# Patient Record
Sex: FEMALE | Race: OTHER | URBAN - METROPOLITAN AREA
[De-identification: names, ages, dates, MRNs, and addresses within clinical notes are randomized per-mention and may not be internally consistent; named-entity substitution may affect disease eponyms.]

---

## 2018-03-08 ENCOUNTER — EMERGENCY (EMERGENCY)
Facility: HOSPITAL | Age: 63
LOS: 0 days | Discharge: HOME | End: 2018-03-09
Attending: EMERGENCY MEDICINE

## 2018-03-08 VITALS
HEART RATE: 75 BPM | OXYGEN SATURATION: 96 % | SYSTOLIC BLOOD PRESSURE: 174 MMHG | RESPIRATION RATE: 20 BRPM | TEMPERATURE: 98 F | DIASTOLIC BLOOD PRESSURE: 82 MMHG

## 2018-03-08 DIAGNOSIS — J18.9 PNEUMONIA, UNSPECIFIED ORGANISM: ICD-10-CM

## 2018-03-08 DIAGNOSIS — Z91.018 ALLERGY TO OTHER FOODS: ICD-10-CM

## 2018-03-08 DIAGNOSIS — R06.02 SHORTNESS OF BREATH: ICD-10-CM

## 2018-03-08 DIAGNOSIS — Z98.891 HISTORY OF UTERINE SCAR FROM PREVIOUS SURGERY: Chronic | ICD-10-CM

## 2018-03-08 DIAGNOSIS — Z98.890 OTHER SPECIFIED POSTPROCEDURAL STATES: ICD-10-CM

## 2018-03-08 RX ORDER — IPRATROPIUM/ALBUTEROL SULFATE 18-103MCG
3 AEROSOL WITH ADAPTER (GRAM) INHALATION ONCE
Qty: 0 | Refills: 0 | Status: COMPLETED | OUTPATIENT
Start: 2018-03-08 | End: 2018-03-08

## 2018-03-08 RX ADMIN — Medication 3 MILLILITER(S): at 23:37

## 2018-03-09 VITALS
OXYGEN SATURATION: 95 % | RESPIRATION RATE: 16 BRPM | HEART RATE: 86 BPM | DIASTOLIC BLOOD PRESSURE: 70 MMHG | TEMPERATURE: 99 F | SYSTOLIC BLOOD PRESSURE: 123 MMHG

## 2018-03-09 LAB
ALBUMIN SERPL ELPH-MCNC: 4 G/DL — SIGNIFICANT CHANGE UP (ref 3–5.5)
ALP SERPL-CCNC: 72 U/L — SIGNIFICANT CHANGE UP (ref 30–115)
ALT FLD-CCNC: 34 U/L — SIGNIFICANT CHANGE UP (ref 0–41)
ANION GAP SERPL CALC-SCNC: 10 MMOL/L — SIGNIFICANT CHANGE UP (ref 7–14)
AST SERPL-CCNC: 45 U/L — HIGH (ref 0–41)
BASOPHILS # BLD AUTO: 0.04 K/UL — SIGNIFICANT CHANGE UP (ref 0–0.2)
BASOPHILS NFR BLD AUTO: 0.4 % — SIGNIFICANT CHANGE UP (ref 0–1)
BILIRUB SERPL-MCNC: 0.5 MG/DL — SIGNIFICANT CHANGE UP (ref 0.2–1.2)
BUN SERPL-MCNC: 8 MG/DL — LOW (ref 10–20)
CALCIUM SERPL-MCNC: 8.8 MG/DL — SIGNIFICANT CHANGE UP (ref 8.5–10.1)
CHLORIDE SERPL-SCNC: 100 MMOL/L — SIGNIFICANT CHANGE UP (ref 98–110)
CO2 SERPL-SCNC: 25 MMOL/L — SIGNIFICANT CHANGE UP (ref 17–32)
CREAT SERPL-MCNC: 0.8 MG/DL — SIGNIFICANT CHANGE UP (ref 0.7–1.5)
EOSINOPHIL # BLD AUTO: 0.34 K/UL — SIGNIFICANT CHANGE UP (ref 0–0.7)
EOSINOPHIL NFR BLD AUTO: 3.7 % — SIGNIFICANT CHANGE UP (ref 0–8)
GLUCOSE SERPL-MCNC: 109 MG/DL — SIGNIFICANT CHANGE UP (ref 70–110)
HCT VFR BLD CALC: 43.4 % — SIGNIFICANT CHANGE UP (ref 37–47)
HGB BLD-MCNC: 14.2 G/DL — SIGNIFICANT CHANGE UP (ref 12–16)
IMM GRANULOCYTES NFR BLD AUTO: 0.2 % — SIGNIFICANT CHANGE UP (ref 0.1–0.3)
LYMPHOCYTES # BLD AUTO: 0.59 K/UL — LOW (ref 1.2–3.4)
LYMPHOCYTES # BLD AUTO: 6.4 % — LOW (ref 20.5–51.1)
MCHC RBC-ENTMCNC: 27.4 PG — SIGNIFICANT CHANGE UP (ref 27–31)
MCHC RBC-ENTMCNC: 32.7 G/DL — SIGNIFICANT CHANGE UP (ref 32–37)
MCV RBC AUTO: 83.8 FL — SIGNIFICANT CHANGE UP (ref 81–99)
MONOCYTES # BLD AUTO: 0.44 K/UL — SIGNIFICANT CHANGE UP (ref 0.1–0.6)
MONOCYTES NFR BLD AUTO: 4.8 % — SIGNIFICANT CHANGE UP (ref 1.7–9.3)
NEUTROPHILS # BLD AUTO: 7.77 K/UL — HIGH (ref 1.4–6.5)
NEUTROPHILS NFR BLD AUTO: 84.5 % — HIGH (ref 42.2–75.2)
NRBC # BLD: 0 /100 WBCS — SIGNIFICANT CHANGE UP (ref 0–0)
PLATELET # BLD AUTO: 235 K/UL — SIGNIFICANT CHANGE UP (ref 130–400)
POTASSIUM SERPL-MCNC: 3.6 MMOL/L — SIGNIFICANT CHANGE UP (ref 3.5–5)
POTASSIUM SERPL-SCNC: 3.6 MMOL/L — SIGNIFICANT CHANGE UP (ref 3.5–5)
PROT SERPL-MCNC: 6.8 G/DL — SIGNIFICANT CHANGE UP (ref 6–8)
RBC # BLD: 5.18 M/UL — SIGNIFICANT CHANGE UP (ref 4.2–5.4)
RBC # FLD: 11.6 % — SIGNIFICANT CHANGE UP (ref 11.5–14.5)
SODIUM SERPL-SCNC: 135 MMOL/L — SIGNIFICANT CHANGE UP (ref 135–146)
WBC # BLD: 9.2 K/UL — SIGNIFICANT CHANGE UP (ref 4.8–10.8)
WBC # FLD AUTO: 9.2 K/UL — SIGNIFICANT CHANGE UP (ref 4.8–10.8)

## 2018-03-09 RX ORDER — ALBUTEROL 90 UG/1
1 AEROSOL, METERED ORAL ONCE
Qty: 0 | Refills: 0 | Status: COMPLETED | OUTPATIENT
Start: 2018-03-09 | End: 2018-03-09

## 2018-03-09 RX ORDER — CIPROFLOXACIN LACTATE 400MG/40ML
1 VIAL (ML) INTRAVENOUS
Qty: 10 | Refills: 0 | OUTPATIENT
Start: 2018-03-09 | End: 2018-03-18

## 2018-03-09 RX ORDER — ACETAMINOPHEN 500 MG
975 TABLET ORAL ONCE
Qty: 0 | Refills: 0 | Status: COMPLETED | OUTPATIENT
Start: 2018-03-09 | End: 2018-03-09

## 2018-03-09 RX ADMIN — Medication 975 MILLIGRAM(S): at 03:44

## 2018-03-09 RX ADMIN — ALBUTEROL 1 PUFF(S): 90 AEROSOL, METERED ORAL at 04:18

## 2018-03-09 NOTE — ED PROVIDER NOTE - PHYSICAL EXAMINATION
VITAL SIGNS: I have reviewed nursing notes and confirm.  CONSTITUTIONAL: Well-developed; well-nourished; in no acute distress.  SKIN: Skin exam is warm and dry, no acute rash.  HEAD: Normocephalic; atraumatic.  EYES: PERRL, EOM intact; conjunctiva and sclera clear.  ENT: +clear nasal discharge; airway clear. TMs clear.  NECK: Supple; non tender. No lad.  CARD: S1, S2 normal; no murmurs, gallops, or rubs. Regular rate and rhythm.  RESP: +rhonchi cleared w/coughing, no wheezes/rales  ABD: Normal bowel sounds; soft; non-distended; non-tender; no hepatosplenomegaly.  EXT: Normal ROM. No clubbing, cyanosis or edema.  NEURO: Alert, oriented. Grossly unremarkable. No focal deficits.  PSYCH: Cooperative, appropriate.

## 2018-03-09 NOTE — ED PROVIDER NOTE - CARE PLAN
Principal Discharge DX:	Cough  Secondary Diagnosis:	SOB (shortness of breath) Principal Discharge DX:	Pneumonia  Secondary Diagnosis:	SOB (shortness of breath)  Secondary Diagnosis:	Cough

## 2018-03-09 NOTE — ED PROVIDER NOTE - NS ED ROS FT
Constitutional: No fever, chills, unintended weight loss.  Eyes:  No visual changes, eye pain or discharge.  ENMT:  No hearing changes, pain, no sore throat or runny nose, no difficulty swallowing  Cardiac:  No chest pain, +SOB, no edema. No chest pain with exertion.  Respiratory:  +cough no respiratory distress. No hemoptysis. No history of asthma or RAD.  GI:  No nausea, vomiting, diarrhea or abdominal pain.  :  No dysuria, frequency or burning.  MS:  No myalgia, muscle weakness, joint pain or back pain.  Neuro:  No headache or weakness.  No LOC.  Skin:  No skin rash.   Endocrine: No history of thyroid disease or diabetes.

## 2018-03-09 NOTE — ED PROVIDER NOTE - OBJECTIVE STATEMENT
61yo f no pmh p/w sob x 2d. Accomp by congestion & incr cough x 2 wks. Denies f/c, cp, gallo, nvd, abd pain, urinary sx, rash. +Sick contact, grandson w/viral illness. Non-smoker.

## 2018-03-09 NOTE — ED PROVIDER NOTE - PROGRESS NOTE DETAILS
pt feeling much better, SOB improved - return precautions given, son @ bedside - advised close f/u with PMD in [place] pt re-vitaled prior to dispo and now febrile - given clinical hx will tx for PNA

## 2024-07-31 ENCOUNTER — EMERGENCY (EMERGENCY)
Facility: HOSPITAL | Age: 69
LOS: 0 days | Discharge: HOME | End: 2024-07-31
Attending: EMERGENCY MEDICINE
Payer: MEDICAID

## 2024-07-31 VITALS
DIASTOLIC BLOOD PRESSURE: 69 MMHG | HEART RATE: 88 BPM | RESPIRATION RATE: 18 BRPM | HEIGHT: 59 IN | OXYGEN SATURATION: 100 % | SYSTOLIC BLOOD PRESSURE: 131 MMHG | TEMPERATURE: 98 F | WEIGHT: 104.06 LBS

## 2024-07-31 VITALS
OXYGEN SATURATION: 98 % | HEART RATE: 65 BPM | RESPIRATION RATE: 18 BRPM | TEMPERATURE: 98 F | DIASTOLIC BLOOD PRESSURE: 66 MMHG | SYSTOLIC BLOOD PRESSURE: 119 MMHG

## 2024-07-31 DIAGNOSIS — E78.5 HYPERLIPIDEMIA, UNSPECIFIED: ICD-10-CM

## 2024-07-31 DIAGNOSIS — R19.7 DIARRHEA, UNSPECIFIED: ICD-10-CM

## 2024-07-31 DIAGNOSIS — Z91.018 ALLERGY TO OTHER FOODS: ICD-10-CM

## 2024-07-31 DIAGNOSIS — K57.92 DIVERTICULITIS OF INTESTINE, PART UNSPECIFIED, WITHOUT PERFORATION OR ABSCESS WITHOUT BLEEDING: ICD-10-CM

## 2024-07-31 DIAGNOSIS — R10.31 RIGHT LOWER QUADRANT PAIN: ICD-10-CM

## 2024-07-31 DIAGNOSIS — Z98.891 HISTORY OF UTERINE SCAR FROM PREVIOUS SURGERY: Chronic | ICD-10-CM

## 2024-07-31 DIAGNOSIS — R50.9 FEVER, UNSPECIFIED: ICD-10-CM

## 2024-07-31 LAB
ALBUMIN SERPL ELPH-MCNC: 4.3 G/DL — SIGNIFICANT CHANGE UP (ref 3.5–5.2)
ALP SERPL-CCNC: 58 U/L — SIGNIFICANT CHANGE UP (ref 30–115)
ALT FLD-CCNC: 11 U/L — SIGNIFICANT CHANGE UP (ref 0–41)
ANION GAP SERPL CALC-SCNC: 11 MMOL/L — SIGNIFICANT CHANGE UP (ref 7–14)
APTT BLD: 34.9 SEC — SIGNIFICANT CHANGE UP (ref 27–39.2)
AST SERPL-CCNC: 15 U/L — SIGNIFICANT CHANGE UP (ref 0–41)
BASOPHILS # BLD AUTO: 0.05 K/UL — SIGNIFICANT CHANGE UP (ref 0–0.2)
BASOPHILS NFR BLD AUTO: 0.4 % — SIGNIFICANT CHANGE UP (ref 0–1)
BILIRUB SERPL-MCNC: 0.6 MG/DL — SIGNIFICANT CHANGE UP (ref 0.2–1.2)
BLD GP AB SCN SERPL QL: SIGNIFICANT CHANGE UP
BUN SERPL-MCNC: 7 MG/DL — LOW (ref 10–20)
CALCIUM SERPL-MCNC: 8.4 MG/DL — SIGNIFICANT CHANGE UP (ref 8.4–10.5)
CHLORIDE SERPL-SCNC: 104 MMOL/L — SIGNIFICANT CHANGE UP (ref 98–110)
CO2 SERPL-SCNC: 24 MMOL/L — SIGNIFICANT CHANGE UP (ref 17–32)
CREAT SERPL-MCNC: 0.7 MG/DL — SIGNIFICANT CHANGE UP (ref 0.7–1.5)
EGFR: 95 ML/MIN/1.73M2 — SIGNIFICANT CHANGE UP
EOSINOPHIL # BLD AUTO: 0.2 K/UL — SIGNIFICANT CHANGE UP (ref 0–0.7)
EOSINOPHIL NFR BLD AUTO: 1.6 % — SIGNIFICANT CHANGE UP (ref 0–8)
GLUCOSE SERPL-MCNC: 93 MG/DL — SIGNIFICANT CHANGE UP (ref 70–99)
HCT VFR BLD CALC: 41.8 % — SIGNIFICANT CHANGE UP (ref 37–47)
HGB BLD-MCNC: 13.5 G/DL — SIGNIFICANT CHANGE UP (ref 12–16)
IMM GRANULOCYTES NFR BLD AUTO: 0.3 % — SIGNIFICANT CHANGE UP (ref 0.1–0.3)
INR BLD: 1.03 RATIO — SIGNIFICANT CHANGE UP (ref 0.65–1.3)
LYMPHOCYTES # BLD AUTO: 1.32 K/UL — SIGNIFICANT CHANGE UP (ref 1.2–3.4)
LYMPHOCYTES # BLD AUTO: 10.5 % — LOW (ref 20.5–51.1)
MCHC RBC-ENTMCNC: 28 PG — SIGNIFICANT CHANGE UP (ref 27–31)
MCHC RBC-ENTMCNC: 32.3 G/DL — SIGNIFICANT CHANGE UP (ref 32–37)
MCV RBC AUTO: 86.5 FL — SIGNIFICANT CHANGE UP (ref 81–99)
MONOCYTES # BLD AUTO: 0.67 K/UL — HIGH (ref 0.1–0.6)
MONOCYTES NFR BLD AUTO: 5.3 % — SIGNIFICANT CHANGE UP (ref 1.7–9.3)
NEUTROPHILS # BLD AUTO: 10.28 K/UL — HIGH (ref 1.4–6.5)
NEUTROPHILS NFR BLD AUTO: 81.9 % — HIGH (ref 42.2–75.2)
NRBC # BLD: 0 /100 WBCS — SIGNIFICANT CHANGE UP (ref 0–0)
PLATELET # BLD AUTO: 249 K/UL — SIGNIFICANT CHANGE UP (ref 130–400)
PMV BLD: 9.4 FL — SIGNIFICANT CHANGE UP (ref 7.4–10.4)
POTASSIUM SERPL-MCNC: 4 MMOL/L — SIGNIFICANT CHANGE UP (ref 3.5–5)
POTASSIUM SERPL-SCNC: 4 MMOL/L — SIGNIFICANT CHANGE UP (ref 3.5–5)
PROT SERPL-MCNC: 7 G/DL — SIGNIFICANT CHANGE UP (ref 6–8)
PROTHROM AB SERPL-ACNC: 11.8 SEC — SIGNIFICANT CHANGE UP (ref 9.95–12.87)
RBC # BLD: 4.83 M/UL — SIGNIFICANT CHANGE UP (ref 4.2–5.4)
RBC # FLD: 12.3 % — SIGNIFICANT CHANGE UP (ref 11.5–14.5)
SODIUM SERPL-SCNC: 139 MMOL/L — SIGNIFICANT CHANGE UP (ref 135–146)
WBC # BLD: 12.56 K/UL — HIGH (ref 4.8–10.8)
WBC # FLD AUTO: 12.56 K/UL — HIGH (ref 4.8–10.8)

## 2024-07-31 PROCEDURE — 85610 PROTHROMBIN TIME: CPT

## 2024-07-31 PROCEDURE — 74177 CT ABD & PELVIS W/CONTRAST: CPT | Mod: MC

## 2024-07-31 PROCEDURE — 85730 THROMBOPLASTIN TIME PARTIAL: CPT

## 2024-07-31 PROCEDURE — 86850 RBC ANTIBODY SCREEN: CPT

## 2024-07-31 PROCEDURE — 86901 BLOOD TYPING SEROLOGIC RH(D): CPT

## 2024-07-31 PROCEDURE — 99285 EMERGENCY DEPT VISIT HI MDM: CPT

## 2024-07-31 PROCEDURE — 74177 CT ABD & PELVIS W/CONTRAST: CPT | Mod: 26,MC

## 2024-07-31 PROCEDURE — 86900 BLOOD TYPING SEROLOGIC ABO: CPT

## 2024-07-31 PROCEDURE — 36415 COLL VENOUS BLD VENIPUNCTURE: CPT

## 2024-07-31 PROCEDURE — 85025 COMPLETE CBC W/AUTO DIFF WBC: CPT

## 2024-07-31 PROCEDURE — 80053 COMPREHEN METABOLIC PANEL: CPT

## 2024-07-31 PROCEDURE — 96374 THER/PROPH/DIAG INJ IV PUSH: CPT | Mod: XU

## 2024-07-31 PROCEDURE — 99284 EMERGENCY DEPT VISIT MOD MDM: CPT | Mod: 25

## 2024-07-31 PROCEDURE — 96375 TX/PRO/DX INJ NEW DRUG ADDON: CPT

## 2024-07-31 RX ORDER — METRONIDAZOLE 500 MG/1
500 TABLET ORAL ONCE
Refills: 0 | Status: COMPLETED | OUTPATIENT
Start: 2024-07-31 | End: 2024-07-31

## 2024-07-31 RX ORDER — MORPHINE SULFATE 100 MG/1
4 TABLET, EXTENDED RELEASE ORAL ONCE
Refills: 0 | Status: DISCONTINUED | OUTPATIENT
Start: 2024-07-31 | End: 2024-07-31

## 2024-07-31 RX ORDER — METRONIDAZOLE 500 MG/1
1 TABLET ORAL
Qty: 20 | Refills: 0
Start: 2024-07-31 | End: 2024-08-09

## 2024-07-31 RX ORDER — KETOROLAC TROMETHAMINE 30 MG/ML
15 INJECTION, SOLUTION INTRAMUSCULAR ONCE
Refills: 0 | Status: DISCONTINUED | OUTPATIENT
Start: 2024-07-31 | End: 2024-07-31

## 2024-07-31 RX ORDER — CIPROFLOXACIN HCL 500 MG
1 TABLET ORAL
Qty: 20 | Refills: 0
Start: 2024-07-31 | End: 2024-08-09

## 2024-07-31 RX ORDER — CIPROFLOXACIN HCL 500 MG
400 TABLET ORAL ONCE
Refills: 0 | Status: COMPLETED | OUTPATIENT
Start: 2024-07-31 | End: 2024-07-31

## 2024-07-31 RX ADMIN — KETOROLAC TROMETHAMINE 15 MILLIGRAM(S): 30 INJECTION, SOLUTION INTRAMUSCULAR at 18:34

## 2024-07-31 RX ADMIN — METRONIDAZOLE 100 MILLIGRAM(S): 500 TABLET ORAL at 18:03

## 2024-07-31 RX ADMIN — Medication 200 MILLIGRAM(S): at 18:03

## 2024-07-31 RX ADMIN — MORPHINE SULFATE 4 MILLIGRAM(S): 100 TABLET, EXTENDED RELEASE ORAL at 12:09

## 2024-07-31 NOTE — ED ADULT TRIAGE NOTE - CHIEF COMPLAINT QUOTE
Pt PATRICK from [facility] for RLQ abdominal pain with diarrhea starting this morning.

## 2024-07-31 NOTE — ED PROVIDER NOTE - PATIENT PORTAL LINK FT
You can access the FollowMyHealth Patient Portal offered by [facility] by registering at the following website: [email or web address]. By joining [facility]’s FollowMyHealth portal, you will also be able to view your health information using other applications (apps) compatible with our system.

## 2024-07-31 NOTE — ED PROVIDER NOTE - PHYSICAL EXAMINATION
CONST: Well appearing in NAD  EYES: PERRL, EOMI, Sclera and conjunctiva clear.   CARD: Normal S1 S2; Normal rate and rhythm  RESP: Equal BS B/L, No wheezes, rhonchi or rales. No distress  GI: BS equal, moderate tenderness with guarding RLQ  MS: Normal ROM in all extremities. No midline spinal tenderness.  SKIN: Warm, dry, no acute rashes. Good turgor  NEURO: A&Ox3, No focal deficits. Strength 5/5 with no sensory deficits.

## 2024-07-31 NOTE — ED ADULT NURSE NOTE - CHIEF COMPLAINT QUOTE
Pt PATRICK from [facility] for RLQ abdominal pain with diarrhea starting this morning.

## 2024-07-31 NOTE — ED PROVIDER NOTE - CLINICAL SUMMARY MEDICAL DECISION MAKING FREE TEXT BOX
68-year-old female found to have diverticulitis.  No focal abscess.  Vital signs reviewed, labs were ordered and reviewed.  Patient was given dose of antibiotic in ED, prescription for antibiotics was sent to the patient's pharmacy.  She feels comfortable going home, results were discussed with her and her son.  Upon reevaluation there is mild right lower quadrant tenderness to palpation, no rebound and no guarding.  Patient is tolerating p.o., good candidate for outpatient treatment advised to return to emergency room immediately if any worsening/concerning symptoms.  Both patient and son verbalized understanding and are amenable to DC plan.

## 2024-07-31 NOTE — ED PROVIDER NOTE - PROGRESS NOTE DETAILS
Dr. So: sign out to Dr. Bhatt   pending ct read  Ayla [id number] utilized to translate to son and patient diagnosis of diverticulitis and antibiotic instruction. Advised for worsening pain or fever to return to ED.     The patient was given detailed return precautions and advised to return to the emergency department if any new symptoms developed, symptoms worsened or for any concerns. The patient was offered the opportunity to ask questions and verbalized that they understand the diagnosis and discharge instructions. EP: Patient appears well, aware of CT findings, she comfortable going home.  Her son is at the bedside, results of all test were discussed with him including incidental findings.  Strict return precautions given.  Prescription for antibiotic sent to the patient's pharmacy.  Advised to return to the emergency room immediately for any worsening symptoms

## 2024-07-31 NOTE — ED PROVIDER NOTE - OBJECTIVE STATEMENT
69yo female with PMHx prediabetes, HLD presents c/o RLQ pain with subjective fever x 2 days associated with diarrhea, without any specific aggravating or relieving factors. Pt was seen PTA by her PMD who sent her to ED for further evaluation and imaging. Pt denies vomiting. PSHx  x 2

## 2024-07-31 NOTE — ED ADULT NURSE NOTE - NSFALLUNIVINTERV_ED_ALL_ED
Bed/Stretcher in lowest position, wheels locked, appropriate side rails in place/Call bell, personal items and telephone in reach/Instruct patient to call for assistance before getting out of bed/chair/stretcher/Non-slip footwear applied when patient is off stretcher/[place] to call system/Physically safe environment - no spills, clutter or unnecessary equipment/Purposeful proactive rounding/Room/bathroom lighting operational, light cord in reach

## 2024-07-31 NOTE — ED PROVIDER NOTE - PROVIDER TOKENS
FREE:[LAST:[CARE PROVIDER FOR GI REFERRAL],FIRST:[YOUR PRIMARY],PHONE:[(   )    -],FAX:[(   )    -]]

## 2024-07-31 NOTE — ED PROVIDER NOTE - ATTENDING APP SHARED VISIT CONTRIBUTION OF CARE
68-year-old female with pmh of HLD, and pre-DM presents emergency department for 2 days of right lower quadrant abdominal pain associate with loose stool. Subjective fevers and chills.  No nausea no vomiting.  Abdominal surgeries significant for .    Const: No apparent distress  Eyes: PERRL, no conjunctival injection  HENT:  Neck supple without meningismus   CV: RRR, Warm, well-perfused extremities  RESP: CTA B/L, no tachypnea   GI: soft, RLQ tenderness, non-distended  MSK: No gross deformities appreciated  Skin: Warm, dry. No rashes  Neuro: Alert, CNs II-XII grossly intact. Sensation and motor function of extremities grossly intact.  Psych: Appropriate mood and affect.    ru appendicitis

## 2024-10-13 ENCOUNTER — EMERGENCY (EMERGENCY)
Facility: HOSPITAL | Age: 69
LOS: 1 days | Discharge: HOME | End: 2024-10-15
Attending: STUDENT IN AN ORGANIZED HEALTH CARE EDUCATION/TRAINING PROGRAM
Payer: MEDICARE

## 2024-10-13 VITALS
OXYGEN SATURATION: 100 % | RESPIRATION RATE: 18 BRPM | HEIGHT: 64 IN | WEIGHT: 115.08 LBS | DIASTOLIC BLOOD PRESSURE: 86 MMHG | SYSTOLIC BLOOD PRESSURE: 142 MMHG | HEART RATE: 64 BPM | TEMPERATURE: 98 F

## 2024-10-13 DIAGNOSIS — R11.2 NAUSEA WITH VOMITING, UNSPECIFIED: ICD-10-CM

## 2024-10-13 DIAGNOSIS — R10.13 EPIGASTRIC PAIN: ICD-10-CM

## 2024-10-13 DIAGNOSIS — Z91.198 PATIENT'S NONCOMPLIANCE WITH OTHER MEDICAL TREATMENT AND REGIMEN FOR OTHER REASON: ICD-10-CM

## 2024-10-13 DIAGNOSIS — R42 DIZZINESS AND GIDDINESS: ICD-10-CM

## 2024-10-13 DIAGNOSIS — R73.03 PREDIABETES: ICD-10-CM

## 2024-10-13 DIAGNOSIS — I10 ESSENTIAL (PRIMARY) HYPERTENSION: ICD-10-CM

## 2024-10-13 DIAGNOSIS — R93.0 ABNORMAL FINDINGS ON DIAGNOSTIC IMAGING OF SKULL AND HEAD, NOT ELSEWHERE CLASSIFIED: ICD-10-CM

## 2024-10-13 DIAGNOSIS — Z98.891 HISTORY OF UTERINE SCAR FROM PREVIOUS SURGERY: Chronic | ICD-10-CM

## 2024-10-13 DIAGNOSIS — Z91.013 ALLERGY TO SEAFOOD: ICD-10-CM

## 2024-10-13 LAB
ALBUMIN SERPL ELPH-MCNC: 4.3 G/DL — SIGNIFICANT CHANGE UP (ref 3.5–5.2)
ALP SERPL-CCNC: 53 U/L — SIGNIFICANT CHANGE UP (ref 30–115)
ALT FLD-CCNC: 12 U/L — SIGNIFICANT CHANGE UP (ref 0–41)
ANION GAP SERPL CALC-SCNC: 9 MMOL/L — SIGNIFICANT CHANGE UP (ref 7–14)
APPEARANCE UR: CLEAR — SIGNIFICANT CHANGE UP
AST SERPL-CCNC: 17 U/L — SIGNIFICANT CHANGE UP (ref 0–41)
BASOPHILS # BLD AUTO: 0.03 K/UL — SIGNIFICANT CHANGE UP (ref 0–0.2)
BASOPHILS NFR BLD AUTO: 0.4 % — SIGNIFICANT CHANGE UP (ref 0–1)
BILIRUB SERPL-MCNC: 0.4 MG/DL — SIGNIFICANT CHANGE UP (ref 0.2–1.2)
BILIRUB UR-MCNC: NEGATIVE — SIGNIFICANT CHANGE UP
BUN SERPL-MCNC: 12 MG/DL — SIGNIFICANT CHANGE UP (ref 10–20)
CALCIUM SERPL-MCNC: 8.6 MG/DL — SIGNIFICANT CHANGE UP (ref 8.4–10.4)
CHLORIDE SERPL-SCNC: 100 MMOL/L — SIGNIFICANT CHANGE UP (ref 98–110)
CO2 SERPL-SCNC: 26 MMOL/L — SIGNIFICANT CHANGE UP (ref 17–32)
COLOR SPEC: YELLOW — SIGNIFICANT CHANGE UP
CREAT SERPL-MCNC: 0.6 MG/DL — LOW (ref 0.7–1.5)
DIFF PNL FLD: NEGATIVE — SIGNIFICANT CHANGE UP
EGFR: 98 ML/MIN/1.73M2 — SIGNIFICANT CHANGE UP
EOSINOPHIL # BLD AUTO: 0.19 K/UL — SIGNIFICANT CHANGE UP (ref 0–0.7)
EOSINOPHIL NFR BLD AUTO: 2.5 % — SIGNIFICANT CHANGE UP (ref 0–8)
GLUCOSE SERPL-MCNC: 117 MG/DL — HIGH (ref 70–99)
GLUCOSE UR QL: NEGATIVE MG/DL — SIGNIFICANT CHANGE UP
HCT VFR BLD CALC: 42.4 % — SIGNIFICANT CHANGE UP (ref 37–47)
HGB BLD-MCNC: 13.4 G/DL — SIGNIFICANT CHANGE UP (ref 12–16)
IMM GRANULOCYTES NFR BLD AUTO: 0.3 % — SIGNIFICANT CHANGE UP (ref 0.1–0.3)
KETONES UR-MCNC: NEGATIVE MG/DL — SIGNIFICANT CHANGE UP
LEUKOCYTE ESTERASE UR-ACNC: NEGATIVE — SIGNIFICANT CHANGE UP
LIDOCAIN IGE QN: 32 U/L — SIGNIFICANT CHANGE UP (ref 7–60)
LYMPHOCYTES # BLD AUTO: 0.83 K/UL — LOW (ref 1.2–3.4)
LYMPHOCYTES # BLD AUTO: 10.8 % — LOW (ref 20.5–51.1)
MCHC RBC-ENTMCNC: 27.2 PG — SIGNIFICANT CHANGE UP (ref 27–31)
MCHC RBC-ENTMCNC: 31.6 G/DL — LOW (ref 32–37)
MCV RBC AUTO: 86.2 FL — SIGNIFICANT CHANGE UP (ref 81–99)
MONOCYTES # BLD AUTO: 0.26 K/UL — SIGNIFICANT CHANGE UP (ref 0.1–0.6)
MONOCYTES NFR BLD AUTO: 3.4 % — SIGNIFICANT CHANGE UP (ref 1.7–9.3)
NEUTROPHILS # BLD AUTO: 6.37 K/UL — SIGNIFICANT CHANGE UP (ref 1.4–6.5)
NEUTROPHILS NFR BLD AUTO: 82.6 % — HIGH (ref 42.2–75.2)
NITRITE UR-MCNC: NEGATIVE — SIGNIFICANT CHANGE UP
NRBC # BLD: 0 /100 WBCS — SIGNIFICANT CHANGE UP (ref 0–0)
PH UR: 7 — SIGNIFICANT CHANGE UP (ref 5–8)
PLATELET # BLD AUTO: 245 K/UL — SIGNIFICANT CHANGE UP (ref 130–400)
PMV BLD: 8.7 FL — SIGNIFICANT CHANGE UP (ref 7.4–10.4)
POTASSIUM SERPL-MCNC: 4.1 MMOL/L — SIGNIFICANT CHANGE UP (ref 3.5–5)
POTASSIUM SERPL-SCNC: 4.1 MMOL/L — SIGNIFICANT CHANGE UP (ref 3.5–5)
PROT SERPL-MCNC: 6.8 G/DL — SIGNIFICANT CHANGE UP (ref 6–8)
PROT UR-MCNC: SIGNIFICANT CHANGE UP MG/DL
RBC # BLD: 4.92 M/UL — SIGNIFICANT CHANGE UP (ref 4.2–5.4)
RBC # FLD: 12.7 % — SIGNIFICANT CHANGE UP (ref 11.5–14.5)
SODIUM SERPL-SCNC: 135 MMOL/L — SIGNIFICANT CHANGE UP (ref 135–146)
SP GR SPEC: >1.03 — HIGH (ref 1–1.03)
TROPONIN T, HIGH SENSITIVITY RESULT: <6 NG/L — SIGNIFICANT CHANGE UP (ref 6–13)
UROBILINOGEN FLD QL: 0.2 MG/DL — SIGNIFICANT CHANGE UP (ref 0.2–1)
WBC # BLD: 7.7 K/UL — SIGNIFICANT CHANGE UP (ref 4.8–10.8)
WBC # FLD AUTO: 7.7 K/UL — SIGNIFICANT CHANGE UP (ref 4.8–10.8)

## 2024-10-13 PROCEDURE — 80053 COMPREHEN METABOLIC PANEL: CPT

## 2024-10-13 PROCEDURE — G0378: CPT

## 2024-10-13 PROCEDURE — 36415 COLL VENOUS BLD VENIPUNCTURE: CPT

## 2024-10-13 PROCEDURE — 74177 CT ABD & PELVIS W/CONTRAST: CPT | Mod: MC

## 2024-10-13 PROCEDURE — 84484 ASSAY OF TROPONIN QUANT: CPT

## 2024-10-13 PROCEDURE — A9579: CPT

## 2024-10-13 PROCEDURE — 83036 HEMOGLOBIN GLYCOSYLATED A1C: CPT

## 2024-10-13 PROCEDURE — 93306 TTE W/DOPPLER COMPLETE: CPT

## 2024-10-13 PROCEDURE — 71045 X-RAY EXAM CHEST 1 VIEW: CPT

## 2024-10-13 PROCEDURE — 82962 GLUCOSE BLOOD TEST: CPT

## 2024-10-13 PROCEDURE — 74177 CT ABD & PELVIS W/CONTRAST: CPT | Mod: 26,MC

## 2024-10-13 PROCEDURE — 85025 COMPLETE CBC W/AUTO DIFF WBC: CPT

## 2024-10-13 PROCEDURE — 70551 MRI BRAIN STEM W/O DYE: CPT | Mod: MC

## 2024-10-13 PROCEDURE — 70548 MR ANGIOGRAPHY NECK W/DYE: CPT

## 2024-10-13 PROCEDURE — 93010 ELECTROCARDIOGRAM REPORT: CPT

## 2024-10-13 PROCEDURE — 99285 EMERGENCY DEPT VISIT HI MDM: CPT | Mod: 25

## 2024-10-13 PROCEDURE — 81003 URINALYSIS AUTO W/O SCOPE: CPT

## 2024-10-13 PROCEDURE — 93005 ELECTROCARDIOGRAM TRACING: CPT

## 2024-10-13 PROCEDURE — 99285 EMERGENCY DEPT VISIT HI MDM: CPT

## 2024-10-13 PROCEDURE — 83690 ASSAY OF LIPASE: CPT

## 2024-10-13 PROCEDURE — 70496 CT ANGIOGRAPHY HEAD: CPT | Mod: MC

## 2024-10-13 PROCEDURE — 71045 X-RAY EXAM CHEST 1 VIEW: CPT | Mod: 26

## 2024-10-13 PROCEDURE — 70544 MR ANGIOGRAPHY HEAD W/O DYE: CPT | Mod: MC

## 2024-10-13 PROCEDURE — 70450 CT HEAD/BRAIN W/O DYE: CPT | Mod: MC

## 2024-10-13 PROCEDURE — 80061 LIPID PANEL: CPT

## 2024-10-13 PROCEDURE — 96374 THER/PROPH/DIAG INJ IV PUSH: CPT | Mod: XU

## 2024-10-13 PROCEDURE — 84443 ASSAY THYROID STIM HORMONE: CPT

## 2024-10-13 PROCEDURE — 70450 CT HEAD/BRAIN W/O DYE: CPT | Mod: 26,MC

## 2024-10-13 RX ORDER — SODIUM CHLORIDE 0.9 % (FLUSH) 0.9 %
1000 SYRINGE (ML) INJECTION ONCE
Refills: 0 | Status: COMPLETED | OUTPATIENT
Start: 2024-10-13 | End: 2024-10-13

## 2024-10-13 RX ORDER — ONDANSETRON HCL/PF 4 MG/2 ML
4 VIAL (ML) INJECTION ONCE
Refills: 0 | Status: COMPLETED | OUTPATIENT
Start: 2024-10-13 | End: 2024-10-13

## 2024-10-13 RX ADMIN — Medication 4 MILLIGRAM(S): at 16:23

## 2024-10-13 RX ADMIN — Medication 2000 MILLILITER(S): at 16:23

## 2024-10-13 NOTE — ED ADULT NURSE NOTE - CHIEF COMPLAINT QUOTE
BIBA 2* dizziness since 8am and HTN(180/100 at home). Pt is prescribed losartan but is non-compliant with meds. No neuro deficits noted in triage, Son at bedside.

## 2024-10-13 NOTE — ED PROVIDER NOTE - DIFFERENTIAL DIAGNOSIS
Benign positional vertigo, vestibular neuronitis  Ménière's disease, infection, Stroke, diabetes, hypothyroidism, arrhythmia, orthostatic hypotension, dural thrombus Differential Diagnosis

## 2024-10-13 NOTE — CONSULT NOTE ADULT - SUBJECTIVE AND OBJECTIVE BOX
CC: Dizziness      HPI:  Cantonese speaking  ID #[id number] Used.  Patient is a 70yo Cantonese speaking right handed female with h/o HTN (Not compliant with antihypertensive son states patient takes it as needed, does not know the name of the medication), p.w acute onset room spinning dizziness starting 9am today. Describes dizziness as constant and persists in all positions; aggravates with standing and walking. Dizziness a/w few episodes of vomiting and nausea. Denies ear symptoms, focal weakness, numbness headache, blurry vision, problems swallowing, lean to one side or any other complaints.    Social History  Denies smoking alcohol or drug use    Home Medications:  Denies being on any routine home medications     Neuro Exam:  MENTAL STATUS: AAOx3, memory intact, fund of knowledge appropriate   LANG/SPEECH: Naming and repetition intact, fluent, follows 3-step commands   CRANIAL NERVES:   II: Pupils equal and reactive, no VF deficits,    III, IV, VI: EOMI. No nystagmus, no diplopia  V: normal sensation in V1, V2, and V3 segments bilaterally   VII: no asymmetry, no nasolabial fold flattening   VIII: normal hearing to speech   IX, X: normal palatal elevation, no uvular deviation   XI: 5/5 head turn and 5/5 shoulder shrug bilaterally   XII: midline tongue protrusion   MOTOR:   Motor exam:             Upper extremity                         Delt     Bicep     Tricep    HG                                                    R         5/5        5/5        5/5       5/5                                                  L          5/5        5/5        5/5       5/5             Lower extremity                        HF         KF        KE       DF         PF                                                     R        5/5        5/5        5/5       5/5         5/5                                                  L         5/5        5/5       5/5       5/5          5/5       SENSORY: Normal to touch, No hemineglect   COORD:   FTN : No dysmetria,   HKS: No Limb ataxia   Gait: Reports feeling slightly dizzy when trying to ambulate        NIHSS: 0  Mental status (0-2): 0   Questions (0-2):0   Commands (0-2):0   Best Gaze (0-2):0   VF (0-3): 0   Facial weakness (0-3):0   RUE (0-4): 0   LUE (0-4): 0   RLE (0-4): 0   LLE (0-4): 0   Sensation (0-2):0   Ataxia (0-2): 0   Aphasia (0-3): 0   Dysarthria (0-2):0   Extinction (0-2): 0          Modified Annabelle score 0 at baseline   0 No symptoms at all   1 No significant disability despite symptoms; able to carry out all usual duties and activities without assistance.   2 Slight disability; unable to carry out all previous activities, but able to look after own affairs.   3 Moderate disability; requiring some help, but able to walk without assistance.   4 Moderately severe disability; unable to walk without assistance and unable to attend to own bodily needs without assistance.   5. Severe disability         Allergies  Sea cucumber (Unknown)  No Known Drug Allergies        LABS:                        13.4   7.70  )-----------( 245      ( 13 Oct 2024 16:15 )             42.4     10-13    135  |  100  |  12  ----------------------------<  117[H]  4.1   |  26  |  0.6[L]    Ca    8.6      13 Oct 2024 16:15    TPro  6.8  /  Alb  4.3  /  TBili  0.4  /  DBili  x   /  AST  17  /  ALT  12  /  AlkPhos  53  10-13    Urinalysis with Rflx Culture (10.13.24 @ 20:35)   Urine Appearance: Clear  Color: Yellow  Specific Gravity: >1.030  pH Urine: 7.0  Protein, Urine: Trace mg/dL  Glucose Qualitative, Urine: Negative mg/dL  Ketone - Urine: Negative mg/dL  Blood, Urine: Negative  Bilirubin: Negative  Urobilinogen: 0.2 mg/dL  Leukocyte Esterase Concentration: Negative  Nitrite: Negative    Troponin T, High Sensitivity (10.13.24 @ 16:44)   Troponin T, High Sensitivity Result: <6:    Lipase (10.13.24 @ 16:15)   Lipase: 32 U/L        Neuro Imaging:  < from: CT Head No Cont (10.13.24 @ 17:57) >  IMPRESSION:    No acute intracranial pathology.    ATTENDING ADDENDUM:  Focal hypodensity in the left transverse sinus likely reflecting   arachnoid granulation. No acute intracranial pathology.    --- End of Report ---    ISAAC RODRIGUEZ MD; Resident Radiologist  This document has been electronically signed.  SUSANA ALCOCER MD; Attending Radiologist  This document has been electronically signed. Oct 13 2024 10:19PM    < end of copied text >           CC: Dizziness      HPI:  Cantonese speaking  ID #[id number] Used.  Patient is a 68yo Cantonese speaking right handed female with h/o HTN (Not compliant with antihypertensive son states patient takes it as needed, does not know the name of the medication), p.w acute onset room spinning dizziness starting 9am today. Describes dizziness as constant and persists in all positions; aggravates with standing and walking. Dizziness a/w few episodes of vomiting and nausea. Denies ear symptoms, focal weakness, numbness headache, blurry vision, problems swallowing, lean to one side or any other complaints.    Social History  Denies smoking alcohol or drug use    Home Medications:  Denies being on any routine home medications     Neuro Exam:  MENTAL STATUS: AAOx3, memory intact, fund of knowledge appropriate   LANG/SPEECH: Naming and repetition intact, fluent, follows 3-step commands   CRANIAL NERVES:   II: Pupils equal and reactive, no VF deficits,    III, IV, VI: EOMI. No nystagmus, no diplopia  V: normal sensation in V1, V2, and V3 segments bilaterally   VII: no asymmetry, no nasolabial fold flattening   VIII: normal hearing to speech   IX, X: normal palatal elevation, no uvular deviation   XI: 5/5 head turn and 5/5 shoulder shrug bilaterally   XII: midline tongue protrusion   MOTOR:   Motor exam:             Upper extremity                         Delt     Bicep     Tricep    HG                                                    R         5/5        5/5        5/5       5/5                                                  L          5/5        5/5        5/5       5/5             Lower extremity                        HF         KF        KE       DF         PF                                                     R        5/5        5/5        5/5       5/5         5/5                                                  L         5/5        5/5       5/5       5/5          5/5       SENSORY: Normal to touch, No hemineglect   COORD:   FTN : No dysmetria,   HKS: No Limb ataxia   Gait: Reports feeling slightly dizzy when trying to ambulate        NIHSS: 0  Mental status (0-2): 0   Questions (0-2):0   Commands (0-2):0   Best Gaze (0-2):0   VF (0-3): 0   Facial weakness (0-3):0   RUE (0-4): 0   LUE (0-4): 0   RLE (0-4): 0   LLE (0-4): 0   Sensation (0-2):0   Ataxia (0-2): 0   Aphasia (0-3): 0   Dysarthria (0-2):0   Extinction (0-2): 0          Modified Annabelle score 0 at baseline   0 No symptoms at all   1 No significant disability despite symptoms; able to carry out all usual duties and activities without assistance.   2 Slight disability; unable to carry out all previous activities, but able to look after own affairs.   3 Moderate disability; requiring some help, but able to walk without assistance.   4 Moderately severe disability; unable to walk without assistance and unable to attend to own bodily needs without assistance.   5. Severe disability         Allergies  Sea cucumber (Unknown)  No Known Drug Allergies        LABS:                        13.4   7.70  )-----------( 245      ( 13 Oct 2024 16:15 )             42.4     10-13    135  |  100  |  12  ----------------------------<  117[H]  4.1   |  26  |  0.6[L]    Ca    8.6      13 Oct 2024 16:15    TPro  6.8  /  Alb  4.3  /  TBili  0.4  /  DBili  x   /  AST  17  /  ALT  12  /  AlkPhos  53  10-13    Urinalysis with Rflx Culture (10.13.24 @ 20:35)   Urine Appearance: Clear  Color: Yellow  Specific Gravity: >1.030  pH Urine: 7.0  Protein, Urine: Trace mg/dL  Glucose Qualitative, Urine: Negative mg/dL  Ketone - Urine: Negative mg/dL  Blood, Urine: Negative  Bilirubin: Negative  Urobilinogen: 0.2 mg/dL  Leukocyte Esterase Concentration: Negative  Nitrite: Negative    Troponin T, High Sensitivity (10.13.24 @ 16:44)   Troponin T, High Sensitivity Result: <6:    Lipase (10.13.24 @ 16:15)   Lipase: 32 U/L        Neuro Imaging:  < from: CT Head No Cont (10.13.24 @ 17:57) >  IMPRESSION:    No acute intracranial pathology.    ATTENDING ADDENDUM:  Focal hypodensity in the left transverse sinus likely reflecting   arachnoid granulation. No acute intracranial pathology.    --- End of Report ---    ISAAC RODRIGUEZ MD; Resident Radiologist  This document has been electronically signed.  SUSANA ALCOCER MD; Attending Radiologist  This document has been electronically signed. Oct 13 2024 10:19PM    < end of copied text >        < from: CT Venogram Brain w/ IV Cont (10.14.24 @ 00:37) >  Findings:    The dural sinuses and deep venous system are patent. There is an   arachnoid granulation in the left transverse sinus.    The intracranial internal carotid arteries are normal in caliber.    Normal caliber of the anterior cerebral arteries. The left anterior   cerebral artery and middle cerebral arteries are normal in caliber.    The vertebral arteries and basilar artery are normal in caliber.    The posterior cerebral arteries are normal in caliber.      IMPRESSION:    No CTV evidence of venous sinus thrombosis.        ******PRELIMINARY REPORT******      ******PRELIMINARY REPORT******       ROBBIE WOLFF MD; Resident Radiologist  This document is a PRELIMINARY interpretation and is pending final   attending approval. Oct 14 2024  1:12AM    < end of copied text >

## 2024-10-13 NOTE — ED PROVIDER NOTE - ATTENDING CONTRIBUTION TO CARE
69 year-old, female patient with PMHx HTN, prediabetes who comes to the ED, accompanied by her son, for dizziness and two episodes of NBNB vomiting that started this morning after she got out of bed. Patient refers that the room feels like it is spinning. She is more comfortable laying down, but when she sits up she feels  more dizzy, On exam patient slightly ataxic no nystagmus present motor and sensation grossly intact S1-S2 CTAB mild epigastric tenderness  Impression  Patient with dizziness mild epigastric pain plan for CT head and pelvis labs meds and reevaluation

## 2024-10-13 NOTE — ED PROVIDER NOTE - CLINICAL SUMMARY MEDICAL DECISION MAKING FREE TEXT BOX
pt here with dizzyness and abd pain. Ct head with possible thrrombus but ct venogram negative. placed in obs

## 2024-10-13 NOTE — ED PROVIDER NOTE - PHYSICAL EXAMINATION
CONSTITUTIONAL: ill appearing, nad, with son in room.   SKIN: Warm dry, normal skin turgor  HEAD: NCAT  EYES: EOMI, PERRLA, no scleral icterus, conjunctiva pink  ENT: normal pharynx with no erythema or exudates. bilateral tympanic membranes mildly erythematous.   NECK: Supple; non tender. Full ROM.  CARD: RRR, no murmurs.  RESP: mild crackles upper left lung field. breath sounds heard bilaterally.   ABD: soft, mild midline epigastric ttp, non-distended, no rebound or guarding.  EXT: Full ROM, no bony tenderness, no pedal edema, no calf tenderness  NEURO: Exam performed with patient supine in bed. normal motor. CN II-XII intact.   PSYCH: Cooperative, appropriate.

## 2024-10-13 NOTE — ED PROVIDER NOTE - PROGRESS NOTE DETAILS
pt still dizzy neuro consulted Desire - s/o Dr. Reddy Desire kim final head ct read family aware of pulmonary nodule and need for f/u Neuro recommending CT Venogram. Recommendations discussed with patient. Patient verbally confirmed she agreed with treatment plan. Neuro recommending CT Venogram. Recommendations discussed with patient. Patient verbally confirmed she agreed with treatment plan.    Incidental finding of pulmonary nodule discussed with patient. Patient and son verbalized confirmation they understood and are advised to perform repeat CT imaging in 3 months time for further diagnostics.    Patient signed out to Dr. Ochoa

## 2024-10-13 NOTE — ED PROVIDER NOTE - OBJECTIVE STATEMENT
Case of a 69 year-old, female patient with PMHx HTN, prediabetes who comes to the ED, accompanied by her son, for dizziness and two episodes of NBNB vomiting that started this morning after she got out of bed. Patient refers that the room feels like it is spinning. She is more comfortable laying down, but when she sits up she feels  more dizzy, but the dizziness is present even while laying down. Refers nausea when the dizziness is more severe and some mild midline epigastric abdominal pain after the vomiting. Otherwise denies fever, chills, cp, sob, ear pain, diarrhea, back pain, changes in urinary/stool habitus, calf tenderness or swelling, recent travel, and sick contacts.

## 2024-10-13 NOTE — ED PROVIDER NOTE - EKG/XRAY ADDITIONAL INFORMATION
EKG Interpretation by Dr. Abihlash Phipps: normal  EKG: NSR, nml axis, normal intervals, no ST Elevations   Independent interpretation of the chest  film performed by: Dr. Abhilash Phipps: MARY CARMEN

## 2024-10-13 NOTE — ED ADULT TRIAGE NOTE - CHIEF COMPLAINT QUOTE
BIBA 2* dizziness since 8am and HTN. Pt is prescribed losartan but is non-compliant with meds. Pt noted with n/v. BIBA 2* dizziness since 8am and HTN(180/100 at home). Pt is prescribed losartan but is non-compliant with meds. No neuro deficits noted in triage, Son at bedside.

## 2024-10-13 NOTE — CONSULT NOTE ADULT - ASSESSMENT
Patient is a 70 yo Cantonese speaking right handed female with h/o HTN (Not compliant with antihypertensive son states patient takes it as needed, does not know the name of the medication), p.w acute onset room spinning dizziness starting 9am today. Describes dizziness as constant and persists in all positions; aggravates with standing and walking. Dizziness a/w few episodes of vomiting and nausea. Denies ear symptoms, focal weakness, numbness headache, blurry vision, problems swallowing, lean to one side or any other complaints. Nonfocal Neuro exam NIHSS 0. Patient reports significant improvement in dizziness since arrival to the hospital.       Labs and Imaging so far:  CTH N/C: Focal hypodensity in the left transverse sinus likely reflecting arachnoid granulation  CT venogram : Pending      #Dizziness r/o Posterior circulation CVA  R/O Cerebral venous thrombosis as suspected on CTH      Recommendations  -Telemonitoring  -N/C MRI BRAIN  -MRA H/N  -ECHO   -Lipid profile , hbaic, TSH  -Meclizine 25mg po q 8 prn for dizziness  -Dispo pending CTV results.     Patient is a 70 yo Cantonese speaking right handed female with h/o HTN (Not compliant with antihypertensive son states patient takes it as needed, does not know the name of the medication), p.w acute onset room spinning dizziness starting 9am today. Describes dizziness as constant and persists in all positions; aggravates with standing and walking. Dizziness a/w few episodes of vomiting and nausea. Denies ear symptoms, focal weakness, numbness headache, blurry vision, problems swallowing, lean to one side or any other complaints. Nonfocal Neuro exam NIHSS 0. Patient reports significant improvement in dizziness since arrival to the hospital.       Labs and Imaging so far:  CTH N/C: Focal hypodensity in the left transverse sinus likely reflecting arachnoid granulation  CT venogram : Pending      #Dizziness r/o Posterior circulation CVA  R/O Cerebral venous thrombosis as suspected on CT      Recommendations  -Telemonitoring  -Obtain CT venogram now (pending)  -N/C MRI BRAIN  -MRA H/N  -ECHO   -Administer Asa 325mg po x1 then asa 81 mg q daily  -Lipid profile , hbaic, TSH  -Meclizine 25mg po q 8 prn for dizziness  -Dispo pending CTV results.     Patient is a 68 yo Cantonese speaking right handed female with h/o HTN (Not compliant with antihypertensive son states patient takes it as needed, does not know the name of the medication), p.w acute onset room spinning dizziness starting 9am today. Describes dizziness as constant and persists in all positions; aggravates with standing and walking. Dizziness a/w few episodes of vomiting and nausea. Denies ear symptoms, focal weakness, numbness headache, blurry vision, problems swallowing, lean to one side or any other complaints. Nonfocal Neuro exam NIHSS 0. Patient reports significant improvement in dizziness since arrival to the hospital. Bp on arrival 142/82      Labs and Imaging so far:  CTH N/C: Focal hypodensity in the left transverse sinus likely reflecting arachnoid granulation  CT venogram : Negative for cerebral venous thrombosis      #DDX: Dizziness r/o Posterior circulation CVA Vs Vestibular Neuronitis/ other vestibulopathy      Recommendations  -Telemonitoring  -Admit to TIA obs   -N/C MRI BRAIN  -MRA H/N  -ECHO   -Administer Asa 325mg po x1 then asa 81 mg q daily  -Lipid profile , hbaic, TSH  -Meclizine 25mg po q 8 prn for dizziness  -Neurovascular attending note will follow

## 2024-10-14 LAB
A1C WITH ESTIMATED AVERAGE GLUCOSE RESULT: 6.1 % — HIGH (ref 4–5.6)
CHOLEST SERPL-MCNC: 174 MG/DL — SIGNIFICANT CHANGE UP
ESTIMATED AVERAGE GLUCOSE: 128 MG/DL — HIGH (ref 68–114)
HDLC SERPL-MCNC: 58 MG/DL — SIGNIFICANT CHANGE UP
LIPID PNL WITH DIRECT LDL SERPL: 101 MG/DL — HIGH
NON HDL CHOLESTEROL: 116 MG/DL — SIGNIFICANT CHANGE UP
TRIGL SERPL-MCNC: 74 MG/DL — SIGNIFICANT CHANGE UP
TSH SERPL-MCNC: 2.13 UIU/ML — SIGNIFICANT CHANGE UP (ref 0.27–4.2)

## 2024-10-14 PROCEDURE — 99223 1ST HOSP IP/OBS HIGH 75: CPT

## 2024-10-14 PROCEDURE — 93306 TTE W/DOPPLER COMPLETE: CPT | Mod: 26

## 2024-10-14 PROCEDURE — 70544 MR ANGIOGRAPHY HEAD W/O DYE: CPT | Mod: 26,MC,XU

## 2024-10-14 PROCEDURE — 70551 MRI BRAIN STEM W/O DYE: CPT | Mod: 26,MC

## 2024-10-14 PROCEDURE — 70496 CT ANGIOGRAPHY HEAD: CPT | Mod: 26,MC

## 2024-10-14 PROCEDURE — 70548 MR ANGIOGRAPHY NECK W/DYE: CPT | Mod: 26

## 2024-10-14 RX ORDER — ASPIRIN 325 MG
325 TABLET ORAL ONCE
Refills: 0 | Status: COMPLETED | OUTPATIENT
Start: 2024-10-14 | End: 2024-10-14

## 2024-10-14 RX ADMIN — Medication 325 MILLIGRAM(S): at 03:05

## 2024-10-14 NOTE — ED ADULT NURSE REASSESSMENT NOTE - NS ED NURSE REASSESS COMMENT FT1
Received report from prior RN. Pt on TELE/O2 monitor. Fall precautions maintained, BA in place, red socks utilized. Comfort measures offered. Pt under ED OBS services, will f/u.

## 2024-10-14 NOTE — ED CDU PROVIDER INITIAL DAY NOTE - ATTENDING CONTRIBUTION TO CARE
69 year-old, female patient with PMHx HTN, prediabetes who comes to the ED, accompanied by her son, for dizziness and two episodes of NBNB vomiting that started this morning after she got out of bed. Patient refers that the room feels like it is spinning. She is more comfortable laying down, but when she sits up she feels  more dizzy, but the dizziness is present even while laying down. Refers nausea when the dizziness is more severe and some mild midline epigastric abdominal pain after the vomiting

## 2024-10-14 NOTE — ED CDU PROVIDER INITIAL DAY NOTE - PROGRESS NOTE DETAILS
Corina #[id number] used. patient resting comfortably and waiting MRI/MRA/echo. Patient states dizziness and nausea have improved. Patient resting comfortably, awaiting MRI/MRA.

## 2024-10-14 NOTE — ED ADULT NURSE REASSESSMENT NOTE - NSFALLHARMRISKINTERV_ED_ALL_ED

## 2024-10-15 VITALS
RESPIRATION RATE: 18 BRPM | SYSTOLIC BLOOD PRESSURE: 136 MMHG | HEART RATE: 54 BPM | OXYGEN SATURATION: 96 % | DIASTOLIC BLOOD PRESSURE: 74 MMHG | TEMPERATURE: 98 F

## 2024-10-15 PROCEDURE — 99239 HOSP IP/OBS DSCHRG MGMT >30: CPT

## 2024-10-15 RX ORDER — MECLIZINE HYDROCLORIDE 25 MG/1
1 TABLET ORAL
Qty: 21 | Refills: 0
Start: 2024-10-15 | End: 2024-10-21

## 2024-10-15 NOTE — ED CDU PROVIDER DISPOSITION NOTE - PATIENT PORTAL LINK FT
You can access the FollowMyHealth Patient Portal offered by [facility] by registering at the following website: [email or web address]. By joining [employer]’s FollowMyHealth portal, you will also be able to view your health information using other applications (apps) compatible with our system.

## 2024-10-15 NOTE — ED CDU PROVIDER DISPOSITION NOTE - NSFOLLOWUPINSTRUCTIONS_ED_ALL_ED_FT
Follow up with PMD and Neurology.    Dizziness    Dizziness can manifest as a feeling of unsteadiness or light-headedness. You may feel like you are about to faint. This condition can be caused by a number of things, including medicines, dehydration, or illness. Drink enough fluid to keep your urine clear or pale yellow. Do not drink alcohol and limit your caffeine intake. Avoid quick or sudden movements.  Rise slowly from chairs and steady yourself until you feel okay. In the morning, first sit up on the side of the bed.    SEEK IMMEDIATE MEDICAL CARE IF YOU HAVE ANY OF THE FOLLOWING SYMPTOMS: vomiting, changes in your vision or speech, weakness in your arms or legs, trouble speaking or swallowing, chest pain, abdominal pain, shortness of breath, sweating, bleeding, headache, neck pain, or fever.

## 2024-10-15 NOTE — ED CDU PROVIDER DISPOSITION NOTE - ATTENDING APP SHARED VISIT CONTRIBUTION OF CARE
I have personally seen and examined this patient. I have fully participated in the care of this patient. I have made amendments to the documentation where appropriate and otherwise agree with the history, physical exam, and plan as documented by the T[facility].     Attending Contribution to care: This is my contribution to care

## 2024-10-15 NOTE — ED CDU PROVIDER DISPOSITION NOTE - CARE PROVIDER_API CALL
Eugenio Lynn  Neurology  [address] [place] [address], [address]  [place] [address]  Phone: [phone]  Fax: [phone]  Follow Up Time:

## 2024-10-15 NOTE — ED CDU PROVIDER SUBSEQUENT DAY NOTE - CLINICAL SUMMARY MEDICAL DECISION MAKING FREE TEXT BOX
69-year-old female past medical history of hypertension, prediabetes who presents to the emergency department for dizziness and vomiting that started yesterday morning.  Patient reports vertiginous symptoms is worse with head movement.  No fever or signs of infectious etiology.  This is not happened to the patient before.  Patient had a workup in the emergency department to include EKG, labs, urine testing, CT head and abdomen pelvis.  CT head shows focal hypodensity in the left transverse sinus.  Patient was evaluated by neurology, who recommended a CT venogram of the brain.  This was performed and showed no evidence of venous sinus thrombus.  Echo performed and shows normal EF.  Patient placed in [facility] status for MRI and MRA of head and neck.  Patient spoken to via  and symptoms improving.  Will continue to follow-up workup and reassess.

## 2024-10-15 NOTE — ED CDU PROVIDER DISPOSITION NOTE - CARE PROVIDERS DIRECT ADDRESSES
[email or web address]

## 2024-10-15 NOTE — ED CDU PROVIDER DISPOSITION NOTE - CLINICAL COURSE
Patient spent >24 hours in EDOU with no events. reports no dizziness in past 24 hours. ambulated without assitance in ER. MRI and MRA without any acute infarct nor significant pathology. resutls explained to patient with . meclizine rx sent to pharmacy. referral given for neuro fu  do not think dizziness is central cause. likely peripheral